# Patient Record
Sex: MALE | Race: WHITE | NOT HISPANIC OR LATINO
[De-identification: names, ages, dates, MRNs, and addresses within clinical notes are randomized per-mention and may not be internally consistent; named-entity substitution may affect disease eponyms.]

---

## 2017-01-10 ENCOUNTER — APPOINTMENT (OUTPATIENT)
Dept: HEART AND VASCULAR | Facility: CLINIC | Age: 67
End: 2017-01-10

## 2017-01-10 VITALS — BODY MASS INDEX: 36.61 KG/M2 | SYSTOLIC BLOOD PRESSURE: 134 MMHG | DIASTOLIC BLOOD PRESSURE: 80 MMHG | WEIGHT: 220 LBS

## 2017-01-10 DIAGNOSIS — R55 SYNCOPE AND COLLAPSE: ICD-10-CM

## 2017-01-10 DIAGNOSIS — I25.10 ATHEROSCLEROTIC HEART DISEASE OF NATIVE CORONARY ARTERY W/OUT ANGINA PECTORIS: ICD-10-CM

## 2017-01-10 DIAGNOSIS — I44.7 LEFT BUNDLE-BRANCH BLOCK, UNSPECIFIED: ICD-10-CM

## 2017-01-10 DIAGNOSIS — Z82.49 FAMILY HISTORY OF ISCHEMIC HEART DISEASE AND OTHER DISEASES OF THE CIRCULATORY SYSTEM: ICD-10-CM

## 2017-01-11 PROBLEM — R55 SYNCOPE AND COLLAPSE: Status: ACTIVE | Noted: 2017-01-10

## 2017-02-14 ENCOUNTER — FORM ENCOUNTER (OUTPATIENT)
Age: 67
End: 2017-02-14

## 2017-02-15 ENCOUNTER — OUTPATIENT (OUTPATIENT)
Dept: OUTPATIENT SERVICES | Facility: HOSPITAL | Age: 67
LOS: 1 days | End: 2017-02-15
Payer: MEDICARE

## 2017-02-15 DIAGNOSIS — I25.10 ATHEROSCLEROTIC HEART DISEASE OF NATIVE CORONARY ARTERY WITHOUT ANGINA PECTORIS: ICD-10-CM

## 2017-02-15 DIAGNOSIS — R06.09 OTHER FORMS OF DYSPNEA: ICD-10-CM

## 2017-02-15 PROCEDURE — 93018 CV STRESS TEST I&R ONLY: CPT

## 2017-02-15 PROCEDURE — 78452 HT MUSCLE IMAGE SPECT MULT: CPT

## 2017-02-15 PROCEDURE — 78452 HT MUSCLE IMAGE SPECT MULT: CPT | Mod: 26

## 2017-02-15 PROCEDURE — 93016 CV STRESS TEST SUPVJ ONLY: CPT

## 2017-02-15 PROCEDURE — A9505: CPT

## 2017-02-15 PROCEDURE — A9500: CPT

## 2017-02-15 PROCEDURE — 93017 CV STRESS TEST TRACING ONLY: CPT

## 2020-06-17 ENCOUNTER — APPOINTMENT (OUTPATIENT)
Dept: PULMONOLOGY | Facility: CLINIC | Age: 70
End: 2020-06-17
Payer: MEDICARE

## 2020-06-17 VITALS
RESPIRATION RATE: 12 BRPM | SYSTOLIC BLOOD PRESSURE: 120 MMHG | HEART RATE: 81 BPM | TEMPERATURE: 98 F | WEIGHT: 247 LBS | BODY MASS INDEX: 41.15 KG/M2 | DIASTOLIC BLOOD PRESSURE: 82 MMHG | HEIGHT: 65 IN | OXYGEN SATURATION: 97 %

## 2020-06-17 DIAGNOSIS — Z11.59 ENCOUNTER FOR SCREENING FOR OTHER VIRAL DISEASES: ICD-10-CM

## 2020-06-17 DIAGNOSIS — Z23 ENCOUNTER FOR IMMUNIZATION: ICD-10-CM

## 2020-06-17 DIAGNOSIS — F17.200 NICOTINE DEPENDENCE, UNSPECIFIED, UNCOMPLICATED: ICD-10-CM

## 2020-06-17 PROCEDURE — 99204 OFFICE O/P NEW MOD 45 MIN: CPT

## 2020-06-17 NOTE — REVIEW OF SYSTEMS
[Dyspnea] : dyspnea [Wheezing] : wheezing [Negative] : Endocrine [SOB on Exertion] : sob on exertion [Cough] : no cough [Hemoptysis] : no hemoptysis [Chest Tightness] : no chest tightness [Frequent URIs] : no frequent URIs [Sputum] : no sputum [Pleuritic Pain] : no pleuritic pain [A.M. Dry Mouth] : no a.m. dry mouth

## 2020-06-17 NOTE — PHYSICAL EXAM
[No Acute Distress] : no acute distress [Normal Oropharynx] : normal oropharynx [Normal Appearance] : normal appearance [No Neck Mass] : no neck mass [Normal Rate/Rhythm] : normal rate/rhythm [Normal S1, S2] : normal s1, s2 [No Murmurs] : no murmurs [No Resp Distress] : no resp distress [No Abnormalities] : no abnormalities [Benign] : benign [Clear to Auscultation Bilaterally] : clear to auscultation bilaterally [Normal Gait] : normal gait [No Cyanosis] : no cyanosis [No Clubbing] : no clubbing [FROM] : FROM [No Edema] : no edema [No Focal Deficits] : no focal deficits [Normal Color/ Pigmentation] : normal color/ pigmentation [Normal Affect] : normal affect [Oriented x3] : oriented x3 [III] : Mallampati Class: III

## 2020-06-17 NOTE — ASSESSMENT
[FreeTextEntry1] : SOB\par \par Pt has not followed with cardiology Dr. Geronimo since 2017.  Last echo from 2017 did not show pulmonary HTN.  Pt has gained 27 lbs since 2017.  Discussed when he bends down his abdomen pushes up on the lung.  Discussed SOB could be multifocal due to cardiac disease or lung disease.  He should follow with Dr. Geronimo regarding the heart.  \par \par plan\par - Follow with echo with Dr. Geronimo (r/o pulmonary HTN) \par - Follow on below studies \par \par Current Smoker\par \par Pt with history of 50 yrs of smoking, 1 pack a day.  He is cutting down and taking only a couple puffs of one cigarette.  Discussed we have to r/o emphysema. \par \par Discussed smoking cessation.  I discussed in details with the patient the health risk effects of tobacco smoking. The risks include, but not limited to COPD, cancer of head and neck/lung/stomach/ bladder, and coronary artery disease.  . \par \par Plan\par \par - Smoking cessation\par - Follow with PFT given script for COVID test to get done 3 days prior to PFT \par \par Snoring\par \par KOMAL JACOBS is to have a sleep study. I discussed sleep apnea in detail with the patient. I explained the benefit of weight loss for sleep apnea.  Mallampati III  and neck circumference is greater than 17  inch.\par \par I discussed the short and long term health effect of the obstructive seep apnea with the patient. These effects include, but not limited to, uncontrolled hypertension, CAD, arrhythmias, sudden death, CVA, and pulmonary hypertension. I advised the patient to avoid sedatives, narcotics, driving, and sleeping pills in the meantime. I discussed the therapeutic options including but not limited to CPAP, surgery, and oral appliance. Further recommendations will follow after sleep study.\par \par Plan\par - Follow with sleep study (home).  Pt was given the deposable sleep study equipment during vist \par \par

## 2020-06-21 ENCOUNTER — APPOINTMENT (OUTPATIENT)
Dept: SLEEP CENTER | Facility: HOME HEALTH | Age: 70
End: 2020-06-21
Payer: MEDICARE

## 2020-06-21 PROCEDURE — G0400: CPT | Mod: 26

## 2020-06-23 ENCOUNTER — APPOINTMENT (OUTPATIENT)
Dept: HEART AND VASCULAR | Facility: CLINIC | Age: 70
End: 2020-06-23
Payer: MEDICARE

## 2020-06-23 ENCOUNTER — NON-APPOINTMENT (OUTPATIENT)
Age: 70
End: 2020-06-23

## 2020-06-23 ENCOUNTER — OUTPATIENT (OUTPATIENT)
Dept: OUTPATIENT SERVICES | Facility: HOSPITAL | Age: 70
LOS: 1 days | End: 2020-06-23

## 2020-06-23 VITALS
SYSTOLIC BLOOD PRESSURE: 122 MMHG | HEART RATE: 84 BPM | WEIGHT: 247 LBS | DIASTOLIC BLOOD PRESSURE: 70 MMHG | BODY MASS INDEX: 41.1 KG/M2

## 2020-06-23 DIAGNOSIS — G47.33 OBSTRUCTIVE SLEEP APNEA (ADULT) (PEDIATRIC): ICD-10-CM

## 2020-06-23 DIAGNOSIS — I07.1 RHEUMATIC TRICUSPID INSUFFICIENCY: ICD-10-CM

## 2020-06-23 DIAGNOSIS — R06.02 SHORTNESS OF BREATH: ICD-10-CM

## 2020-06-23 PROCEDURE — 93306 TTE W/DOPPLER COMPLETE: CPT

## 2020-06-23 PROCEDURE — 99204 OFFICE O/P NEW MOD 45 MIN: CPT | Mod: 25

## 2020-06-23 PROCEDURE — 93000 ELECTROCARDIOGRAM COMPLETE: CPT

## 2020-06-23 RX ORDER — ASPIRIN 81 MG/1
81 TABLET ORAL
Refills: 0 | Status: ACTIVE | COMMUNITY

## 2020-06-23 NOTE — HISTORY OF PRESENT ILLNESS
[FreeTextEntry1] : still smoking 1ppd. has chronic KELLY but past few months has noticed intermittent 1-2 flight KELLY- also noticed KELLY when rushes- worse post prandial. No CP. no PND, has chronic @ pillow- had sleep study- results pending. no edema- also gets OOB bending over

## 2020-06-23 NOTE — DISCUSSION/SUMMARY
[FreeTextEntry1] : EKG:NSR,IVCD(no change)\par ECHO:\par normal LVEF, minimal MR/TR,LVH,normal LVEF\par called for and received fax of labs \par TC= 192mg%,TG= 167mg%, LDL= 119mg%, hbA1c= 6.2\par not on low fat diet, \par discussed diet at length- f/u hbA1c with PCP \par spoke to son- he states that his father has not been taking lipitor regularly- ( mostly not)\par pt admits to not taking lipitor- advised to take regularly and repeat albs- if LDL .70mg% would start repatha- given hx would get CCTA for KELLY- although I suspect due to weight and smoking- needs to stop smoking

## 2020-06-30 ENCOUNTER — APPOINTMENT (OUTPATIENT)
Dept: CT IMAGING | Facility: CLINIC | Age: 70
End: 2020-06-30
Payer: MEDICARE

## 2020-06-30 ENCOUNTER — OUTPATIENT (OUTPATIENT)
Dept: OUTPATIENT SERVICES | Facility: HOSPITAL | Age: 70
LOS: 1 days | End: 2020-06-30

## 2020-06-30 ENCOUNTER — RESULT REVIEW (OUTPATIENT)
Age: 70
End: 2020-06-30

## 2020-06-30 PROCEDURE — 75574 CT ANGIO HRT W/3D IMAGE: CPT | Mod: 26

## 2020-07-06 ENCOUNTER — APPOINTMENT (OUTPATIENT)
Dept: PULMONOLOGY | Facility: CLINIC | Age: 70
End: 2020-07-06

## 2020-07-06 VITALS
SYSTOLIC BLOOD PRESSURE: 122 MMHG | TEMPERATURE: 97 F | HEART RATE: 70 BPM | OXYGEN SATURATION: 97 % | RESPIRATION RATE: 17 BRPM | DIASTOLIC BLOOD PRESSURE: 69 MMHG

## 2020-07-06 NOTE — H&P ADULT - ASSESSMENT
71 yo obese M current smoker non-compliant with PMHx 2V CAD s/p PCI RPDA 2013 at [facility], HTN, HLD, 1st degree AV block, known IVCD/ LBBB, tricuspid regurgitation, Pre-Diabetes (Hemoglobin A1C -6.0% upon arrival to cath lab), who presents for cardiac cath to rule out CAD progression due to patient's risk factors, CCS Class Angina Class III Equivalent symptoms, and positive CCTA.     ASA III           Mallampati IV     Risks & benefits of procedure and alternative therapy have been explained to the patient including but not limited to: allergic reaction, bleeding w/possible need for blood transfusion, infection, renal and vascular compromise, limb damage, arrhythmia, stroke, vessel dissection/perforation, Myocardial infarction, emergent CABG. Informed consent obtained and in chart    Hgb/HCT 15.9/48.6-normal. Patient denies bleeding. BRBPR, melena, hematuria, hematemesis. Patient is on ASA 81 mg PO Daily however is non-compliant. ASA  mg PO x 1 and Plavix 600 mg PO x 1 given prior to procedure.    Labs reveal Leukocytosis WBC 11.93 with left shift Neutrophils 7.86. Patient is afebrile and denies fever, chills, cough. No tachycardia. Dr. Gaby hollingsworth.

## 2020-07-06 NOTE — H&P ADULT - NSHPLABSRESULTS_GEN_ALL_CORE
15.9   11.93 )-----------( 150      ( 08 Jul 2020 13:45 )             48.6       07-08    139  |  104  |  10  ----------------------------<  96  4.1   |  22  |  0.75    Ca    9.5      08 Jul 2020 13:45    TPro  7.0  /  Alb  4.1  /  TBili  0.6  /  DBili  <0.2  /  AST  22  /  ALT  33  /  AlkPhos  124<H>  07-08      PT/INR - ( 08 Jul 2020 13:45 )   PT: 12.2 sec;   INR: 1.02          PTT - ( 08 Jul 2020 13:45 )  PTT:42.5 sec    CARDIAC MARKERS ( 08 Jul 2020 13:45 )  x     / x     / 93 U/L / x     / 2.3 ng/mL            EKG: NSR at 70 bpm with 1st degree AV block. LBBB. Left Axis Deviation.

## 2020-07-06 NOTE — H&P ADULT - NSICDXPASTMEDICALHX_GEN_ALL_CORE_FT
PAST MEDICAL HISTORY:  CAD (coronary artery disease)     HLD (hyperlipidemia)     HTN (hypertension)     LBBB (left bundle branch block) ? PAST MEDICAL HISTORY:  CAD (coronary artery disease)     HLD (hyperlipidemia)     HTN (hypertension)     LBBB (left bundle branch block)

## 2020-07-06 NOTE — H&P ADULT - HISTORY OF PRESENT ILLNESS
71 yo M current smoker PMHx CAD, HTN, HLD, 1st degree AV block, ?known LBBB, tricuspid regurg c/o worsening KELLY upon 1-2 blocks ambulation. Pt also endorses 2 pillow orthopnea. Denies cp, palpitations, lightheadedness, recent syncope, LE edema, PND, fever, chills, cough, n/v/d. CCTA 6/30/2020: mod-severe stenosis of pLAD, severe stenosis D1 (small vessel), mod stenosis D2 CTA FFR: . Echo 6/23/20: EF 65%. Thickened mitral valve and aortic valve. Mild basal septal hypertrophy. No WMA.   In light of pt risk factors, CCS Class II angina equivalent symptoms, and positive CCTA with CT FFR, he is now referred to [facility] for cardiac catheterization with possible intervention if clinically indicated.   Cardiac cath 2013: s/p GEETA RPDA. LM nl, LAD mild luminal irregularities, D1 ostial 70%, Prox D2 60%, LCx luminal irregularities, RCA luminal irregularities of RCA, mRPDA 95%. EF 60%. 71 yo M current smoker PMHx CAD (, HTN, HLD, 1st degree AV block, ?known LBBB, tricuspid regurg c/o worsening KELLY upon 1-2 blocks ambulation. Pt also endorses 2 pillow orthopnea. Denies cp, palpitations, lightheadedness, recent syncope, LE edema, PND, fever, chills, cough, n/v/d. CCTA 6/30/2020: mod-severe stenosis of pLAD, severe stenosis D1 (small vessel), mod stenosis D2 CTA FFR: . Echo 6/23/20: EF 65%. Thickened mitral valve and aortic valve. Mild basal septal hypertrophy. No WMA.   In light of pt risk factors, CCS Class II angina equivalent symptoms, and positive CCTA with CT FFR, he is now referred to [facility] for cardiac catheterization with possible intervention if clinically indicated.   Cardiac cath 2013: s/p GEETA RPDA. LM nl, LAD mild luminal irregularities, D1 ostial 70%, Prox D2 60%, LCx luminal irregularities, RCA luminal irregularities of RCA, mRPDA 95%. EF 60%. 69 yo M current smoker PMHx CAD (s/p PCI RPDA 2013, ?known 3vCAD?), HTN, HLD, 1st degree AV block, ?known LBBB, tricuspid regurg c/o worsening KELLY upon 1-2 blocks ambulation. Pt also endorses 2 pillow orthopnea. Denies cp, palpitations, lightheadedness, recent syncope, LE edema, PND, fever, chills, cough, n/v/d. CCTA 6/30/2020: mod-severe stenosis of pLAD, severe stenosis D1 (small vessel), mod stenosis D2 CTA FFR: . Echo 6/23/20: EF 65%. Thickened mitral valve and aortic valve. Mild basal septal hypertrophy. No WMA.   In light of pt risk factors, CCS Class II angina equivalent symptoms, and positive CCTA with CT FFR, he is now referred to [facility] for cardiac catheterization with possible intervention if clinically indicated.   Cardiac cath 2013: s/p GEETA RPDA. LM nl, LAD mild luminal irregularities, D1 ostial 70%, Prox D2 60%, LCx luminal irregularities, RCA luminal irregularities of RCA, mRPDA 95%. EF 60%. COVID Testing not yet scheduled - will call back when scheduled  Confirm meds and social history please  69 yo M current smoker PMHx CAD (s/p PCI RPDA 2013, ?known 3vCAD?), HTN, HLD, 1st degree AV block, ?known LBBB, tricuspid regurg c/o worsening KELLY upon 1-2 blocks ambulation. Pt also endorses 2 pillow orthopnea. Denies cp, palpitations, lightheadedness, recent syncope, LE edema, PND, fever, chills, cough, n/v/d. CCTA 6/30/2020: mod-severe stenosis of pLAD, severe stenosis D1 (small vessel), mod stenosis D2 CTA FFR: . Echo 6/23/20: EF 65%. Thickened mitral valve and aortic valve. Mild basal septal hypertrophy. No WMA.   In light of pt risk factors, CCS Class II angina equivalent symptoms, and positive CCTA with CT FFR, he is now referred to [facility] for cardiac catheterization with possible intervention if clinically indicated.   Cardiac cath 2013: s/p GEETA RPDA. LM nl, LAD mild luminal irregularities, D1 ostial 70%, Prox D2 60%, LCx luminal irregularities, RCA luminal irregularities of RCA, mRPDA 95%. EF 60%. Covid test on arrival to [facility]   71 yo obese M current smoker non-compliant with PMHx 2V CAD s/p PCI RPDA 2013 at [facility], HTN, HLD, 1st degree AV block, known IVCD/ LBBB, tricuspid regurgitation, Pre-Diabetes (Hemoglobin A1C -6.0% upon arrival to cath lab) c/o worsening KELLY upon 1-2 blocks ambulation. Pt also endorses 2 pillow orthopnea. Denies cp, palpitations, lightheadedness, recent syncope, LE edema, PND, fever, chills, cough, n/v/d. CCTA 6/30/2020: mod-severe stenosis of pLAD, severe stenosis D1 (small vessel), mod stenosis D2, non -obstructive disease in remaining coronary artery segments, concentric LVH. Echo 6/23/20: EF 65%. Thickened mitral valve and aortic valve, Mild basal septal hypertrophy, mild LVH,  no segmental wall motion abnormalities, mild Diastolic Dysfunction.   In light of pt risk factors, CCS Class Angina Class III Equivalent symptoms, and positive CCTA he is now referred to [facility] for cardiac catheterization with possible intervention if clinically indicated to rule out CAD progression.    Cardiac cath 2013: s/p GEETA RPDA. LM nl, LAD mild luminal irregularities, D1 ostial 70%, Prox D2 60%, LCx luminal irregularities, RCA luminal irregularities of RCA, mRPDA 95%. EF 60%. Covid test on arrival to [facility] -negative  69 yo obese M current smoker non-compliant with PMHx 2V CAD s/p PCI RPDA 2013 at [facility], HTN, HLD, 1st degree AV block, known IVCD/ LBBB, tricuspid regurgitation, Pre-Diabetes (Hemoglobin A1C -6.0% upon arrival to cath lab) c/o worsening KELLY upon 1-2 blocks ambulation. Pt also endorses 2 pillow orthopnea. Denies cp, palpitations, lightheadedness, recent syncope, LE edema, PND, fever, chills, cough, n/v/d. CCTA 6/30/2020: mod-severe stenosis of pLAD, severe stenosis D1 (small vessel), mod stenosis D2, non -obstructive disease in remaining coronary artery segments, concentric LVH. Echo 6/23/20: EF 65%. Thickened mitral valve and aortic valve, Mild basal septal hypertrophy, mild LVH,  no segmental wall motion abnormalities, mild Diastolic Dysfunction.   In light of pt risk factors, CCS Class Angina Class III Equivalent symptoms, and positive CCTA he is now referred to [facility] for cardiac catheterization with possible intervention if clinically indicated to rule out CAD progression.    Cardiac cath 2013: s/p GEETA RPDA. LM nl, LAD mild luminal irregularities, D1 ostial 70%, Prox D2 60%, LCx luminal irregularities, RCA luminal irregularities of RCA, mRPDA 95%. EF 60%.

## 2020-07-06 NOTE — H&P ADULT - NSHPPOADEEPVENOUSTHROMB_GEN_A_CORE
Sprycel refill request rec from [facility]. Per 3/15/18 office note from Dr Castle pt will continue Sprycel 100 mg daily. Pt has follow up appt on 6/7/18 with Kelly BARBA NP. I have escribed a new rx to [facility].    (Pt gets a 90 day supply of the Sprycel).   no

## 2020-07-08 ENCOUNTER — INPATIENT (INPATIENT)
Facility: HOSPITAL | Age: 70
LOS: 0 days | Discharge: HOME | DRG: 247 | End: 2020-07-09
Attending: INTERNAL MEDICINE | Admitting: INTERNAL MEDICINE
Payer: COMMERCIAL

## 2020-07-08 ENCOUNTER — TRANSCRIPTION ENCOUNTER (OUTPATIENT)
Age: 70
End: 2020-07-08

## 2020-07-08 DIAGNOSIS — G47.30 SLEEP APNEA, UNSPECIFIED: ICD-10-CM

## 2020-07-08 DIAGNOSIS — I25.119 ATHEROSCLEROTIC HEART DISEASE OF NATIVE CORONARY ARTERY WITH UNSPECIFIED ANGINA PECTORIS: ICD-10-CM

## 2020-07-08 DIAGNOSIS — Z98.890 OTHER SPECIFIED POSTPROCEDURAL STATES: Chronic | ICD-10-CM

## 2020-07-08 LAB
A1C WITH ESTIMATED AVERAGE GLUCOSE RESULT: 6 % — HIGH (ref 4–5.6)
ALBUMIN SERPL ELPH-MCNC: 4.1 G/DL — SIGNIFICANT CHANGE UP (ref 3.3–5)
ALP SERPL-CCNC: 124 U/L — HIGH (ref 40–120)
ALT FLD-CCNC: 33 U/L — SIGNIFICANT CHANGE UP (ref 10–45)
ANION GAP SERPL CALC-SCNC: 13 MMOL/L — SIGNIFICANT CHANGE UP (ref 5–17)
APTT BLD: 42.5 SEC — HIGH (ref 27.5–35.5)
AST SERPL-CCNC: 22 U/L — SIGNIFICANT CHANGE UP (ref 10–40)
BASOPHILS # BLD AUTO: 0.07 K/UL — SIGNIFICANT CHANGE UP (ref 0–0.2)
BASOPHILS NFR BLD AUTO: 0.6 % — SIGNIFICANT CHANGE UP (ref 0–2)
BILIRUB SERPL-MCNC: 0.6 MG/DL — SIGNIFICANT CHANGE UP (ref 0.2–1.2)
BUN SERPL-MCNC: 10 MG/DL — SIGNIFICANT CHANGE UP (ref 7–23)
CALCIUM SERPL-MCNC: 9.5 MG/DL — SIGNIFICANT CHANGE UP (ref 8.4–10.5)
CHLORIDE SERPL-SCNC: 104 MMOL/L — SIGNIFICANT CHANGE UP (ref 96–108)
CHOLEST SERPL-MCNC: 117 MG/DL — SIGNIFICANT CHANGE UP (ref 10–199)
CK MB CFR SERPL CALC: 2.3 NG/ML — SIGNIFICANT CHANGE UP (ref 0–6.7)
CK SERPL-CCNC: 93 U/L — SIGNIFICANT CHANGE UP (ref 30–200)
CO2 SERPL-SCNC: 22 MMOL/L — SIGNIFICANT CHANGE UP (ref 22–31)
CREAT SERPL-MCNC: 0.75 MG/DL — SIGNIFICANT CHANGE UP (ref 0.5–1.3)
EOSINOPHIL # BLD AUTO: 0.19 K/UL — SIGNIFICANT CHANGE UP (ref 0–0.5)
EOSINOPHIL NFR BLD AUTO: 1.6 % — SIGNIFICANT CHANGE UP (ref 0–6)
ESTIMATED AVERAGE GLUCOSE: 126 MG/DL — HIGH (ref 68–114)
GLUCOSE SERPL-MCNC: 96 MG/DL — SIGNIFICANT CHANGE UP (ref 70–99)
HCT VFR BLD CALC: 48.6 % — SIGNIFICANT CHANGE UP (ref 39–50)
HDLC SERPL-MCNC: 32 MG/DL — LOW
HGB BLD-MCNC: 15.9 G/DL — SIGNIFICANT CHANGE UP (ref 13–17)
IMM GRANULOCYTES NFR BLD AUTO: 0.3 % — SIGNIFICANT CHANGE UP (ref 0–1.5)
INR BLD: 1.02 — SIGNIFICANT CHANGE UP (ref 0.88–1.16)
LIPID PNL WITH DIRECT LDL SERPL: 61 MG/DL — SIGNIFICANT CHANGE UP
LYMPHOCYTES # BLD AUTO: 25.7 % — SIGNIFICANT CHANGE UP (ref 13–44)
LYMPHOCYTES # BLD AUTO: 3.07 K/UL — SIGNIFICANT CHANGE UP (ref 1–3.3)
MCHC RBC-ENTMCNC: 27.4 PG — SIGNIFICANT CHANGE UP (ref 27–34)
MCHC RBC-ENTMCNC: 32.7 GM/DL — SIGNIFICANT CHANGE UP (ref 32–36)
MCV RBC AUTO: 83.8 FL — SIGNIFICANT CHANGE UP (ref 80–100)
MONOCYTES # BLD AUTO: 0.7 K/UL — SIGNIFICANT CHANGE UP (ref 0–0.9)
MONOCYTES NFR BLD AUTO: 5.9 % — SIGNIFICANT CHANGE UP (ref 2–14)
NEUTROPHILS # BLD AUTO: 7.86 K/UL — HIGH (ref 1.8–7.4)
NEUTROPHILS NFR BLD AUTO: 65.9 % — SIGNIFICANT CHANGE UP (ref 43–77)
NRBC # BLD: 0 /100 WBCS — SIGNIFICANT CHANGE UP (ref 0–0)
PLATELET # BLD AUTO: 150 K/UL — SIGNIFICANT CHANGE UP (ref 150–400)
POTASSIUM SERPL-MCNC: 4.1 MMOL/L — SIGNIFICANT CHANGE UP (ref 3.5–5.3)
POTASSIUM SERPL-SCNC: 4.1 MMOL/L — SIGNIFICANT CHANGE UP (ref 3.5–5.3)
PROT SERPL-MCNC: 7 G/DL — SIGNIFICANT CHANGE UP (ref 6–8.3)
PROTHROM AB SERPL-ACNC: 12.2 SEC — SIGNIFICANT CHANGE UP (ref 10.6–13.6)
RBC # BLD: 5.8 M/UL — SIGNIFICANT CHANGE UP (ref 4.2–5.8)
RBC # FLD: 13.9 % — SIGNIFICANT CHANGE UP (ref 10.3–14.5)
SARS-COV-2 RNA SPEC QL NAA+PROBE: SIGNIFICANT CHANGE UP
SODIUM SERPL-SCNC: 139 MMOL/L — SIGNIFICANT CHANGE UP (ref 135–145)
TOTAL CHOLESTEROL/HDL RATIO MEASUREMENT: 3.7 RATIO — SIGNIFICANT CHANGE UP (ref 3.4–9.6)
TRIGL SERPL-MCNC: 118 MG/DL — SIGNIFICANT CHANGE UP (ref 10–149)
WBC # BLD: 11.93 K/UL — HIGH (ref 3.8–10.5)
WBC # FLD AUTO: 11.93 K/UL — HIGH (ref 3.8–10.5)

## 2020-07-08 PROCEDURE — 92928 PRQ TCAT PLMT NTRAC ST 1 LES: CPT | Mod: LD

## 2020-07-08 PROCEDURE — 93458 L HRT ARTERY/VENTRICLE ANGIO: CPT | Mod: 26,59

## 2020-07-08 PROCEDURE — 93010 ELECTROCARDIOGRAM REPORT: CPT | Mod: 76

## 2020-07-08 PROCEDURE — 92929: CPT | Mod: LD

## 2020-07-08 RX ORDER — CLOPIDOGREL BISULFATE 75 MG/1
75 TABLET, FILM COATED ORAL DAILY
Refills: 0 | Status: DISCONTINUED | OUTPATIENT
Start: 2020-07-09 | End: 2020-07-09

## 2020-07-08 RX ORDER — SODIUM CHLORIDE 9 MG/ML
500 INJECTION INTRAMUSCULAR; INTRAVENOUS; SUBCUTANEOUS
Refills: 0 | Status: DISCONTINUED | OUTPATIENT
Start: 2020-07-08 | End: 2020-07-09

## 2020-07-08 RX ORDER — ATORVASTATIN CALCIUM 80 MG/1
1 TABLET, FILM COATED ORAL
Qty: 0 | Refills: 0 | DISCHARGE

## 2020-07-08 RX ORDER — ASPIRIN/CALCIUM CARB/MAGNESIUM 324 MG
81 TABLET ORAL DAILY
Refills: 0 | Status: DISCONTINUED | OUTPATIENT
Start: 2020-07-08 | End: 2020-07-09

## 2020-07-08 RX ORDER — SODIUM CHLORIDE 9 MG/ML
500 INJECTION INTRAMUSCULAR; INTRAVENOUS; SUBCUTANEOUS
Refills: 0 | Status: DISCONTINUED | OUTPATIENT
Start: 2020-07-08 | End: 2020-07-08

## 2020-07-08 RX ORDER — CLOPIDOGREL BISULFATE 75 MG/1
1 TABLET, FILM COATED ORAL
Qty: 30 | Refills: 11
Start: 2020-07-08 | End: 2021-07-02

## 2020-07-08 RX ORDER — ATORVASTATIN CALCIUM 80 MG/1
80 TABLET, FILM COATED ORAL AT BEDTIME
Refills: 0 | Status: DISCONTINUED | OUTPATIENT
Start: 2020-07-08 | End: 2020-07-09

## 2020-07-08 RX ORDER — CHLORHEXIDINE GLUCONATE 213 G/1000ML
1 SOLUTION TOPICAL ONCE
Refills: 0 | Status: COMPLETED | OUTPATIENT
Start: 2020-07-08 | End: 2020-07-08

## 2020-07-08 RX ORDER — NICOTINE POLACRILEX 2 MG
1 GUM BUCCAL DAILY
Refills: 0 | Status: DISCONTINUED | OUTPATIENT
Start: 2020-07-08 | End: 2020-07-09

## 2020-07-08 RX ORDER — ASPIRIN/CALCIUM CARB/MAGNESIUM 324 MG
1 TABLET ORAL
Qty: 30 | Refills: 11
Start: 2020-07-08 | End: 2021-07-02

## 2020-07-08 RX ORDER — ASPIRIN/CALCIUM CARB/MAGNESIUM 324 MG
1 TABLET ORAL
Qty: 0 | Refills: 0 | DISCHARGE

## 2020-07-08 RX ORDER — CLOPIDOGREL BISULFATE 75 MG/1
600 TABLET, FILM COATED ORAL ONCE
Refills: 0 | Status: COMPLETED | OUTPATIENT
Start: 2020-07-08 | End: 2020-07-08

## 2020-07-08 RX ORDER — NICOTINE POLACRILEX 2 MG
1 GUM BUCCAL ONCE
Refills: 0 | Status: DISCONTINUED | OUTPATIENT
Start: 2020-07-08 | End: 2020-07-08

## 2020-07-08 RX ORDER — ATORVASTATIN CALCIUM 80 MG/1
1 TABLET, FILM COATED ORAL
Qty: 30 | Refills: 3
Start: 2020-07-08 | End: 2020-11-04

## 2020-07-08 RX ORDER — ASPIRIN/CALCIUM CARB/MAGNESIUM 324 MG
325 TABLET ORAL ONCE
Refills: 0 | Status: COMPLETED | OUTPATIENT
Start: 2020-07-08 | End: 2020-07-08

## 2020-07-08 RX ADMIN — CLOPIDOGREL BISULFATE 600 MILLIGRAM(S): 75 TABLET, FILM COATED ORAL at 14:14

## 2020-07-08 RX ADMIN — SODIUM CHLORIDE 75 MILLILITER(S): 9 INJECTION INTRAMUSCULAR; INTRAVENOUS; SUBCUTANEOUS at 14:15

## 2020-07-08 RX ADMIN — Medication 325 MILLIGRAM(S): at 14:14

## 2020-07-08 RX ADMIN — ATORVASTATIN CALCIUM 80 MILLIGRAM(S): 80 TABLET, FILM COATED ORAL at 22:08

## 2020-07-08 RX ADMIN — SODIUM CHLORIDE 75 MILLILITER(S): 9 INJECTION INTRAMUSCULAR; INTRAVENOUS; SUBCUTANEOUS at 19:28

## 2020-07-08 RX ADMIN — Medication 81 MILLIGRAM(S): at 22:08

## 2020-07-08 NOTE — DISCHARGE NOTE PROVIDER - NSDCFUADDINST_GEN_ALL_CORE_FT
The catheter from your groin was removed and bleeding was stopped with a closure device.  After 24hours you may take off the dressing and shower. Wash the site with soap and water.  There is no need to put on another bandage.  Avoid tub baths, hot tubs or swimming for 5 days.     The catheter from your wrist was removed and bleeding was stopped by manual pressure.  Wash the site daily with soap and water.  There is no need to put on a bandage.      Call the Interventional Cardiolog [facility] at [phone] if any of following occur pertaining to your vascular access site:  Bleeding or hematoma formation (collection of blood under the skin), drainage or redness at the puncture site, numbness, decrease in strength, coolness or pale coloration of skin of the leg or hand. The catheter from your groin was removed and bleeding was stopped with a closure device.  After 24hours you may take off the dressing and shower. Wash the site with soap and water.  There is no need to put on another bandage.  Avoid tub baths, hot tubs or swimming for 5 days.     The catheter from your wrist was removed and bleeding was stopped by manual pressure.  Wash the site daily with soap and water.  There is no need to put on a bandage.      Call the Interventional Cardiology Team at [phone] if any of following occur pertaining to your vascular access site:  Bleeding or hematoma formation (collection of blood under the skin), drainage or redness at the puncture site, numbness, decrease in strength, paralysis (unable to move arm), coolness or pale coloration of skin of the leg or hand, fever, chills.

## 2020-07-08 NOTE — DISCHARGE NOTE PROVIDER - CARE PROVIDER_API CALL
Manan Geronimo S  CARDIOVASCULAR DISEASE  [address] [place] [address]  Phone: [phone]  Fax: [phone]  Follow Up Time: 1 week

## 2020-07-08 NOTE — DISCHARGE NOTE PROVIDER - HOSPITAL COURSE
Patient is a 61 year-old obese male current smoker, non-compliant with medical tx, with PMHx 2V CAD s/p PCI RPDA 2013 at [facility], HTN, HLD, 1st degree AV block, known IVCD/ LBBB, tricuspid regurgitation, Pre-Diabetes (Hemoglobin A1C -6.0% upon arrival to cath lab) c/o worsening KELLY upon 1-2 blocks ambulation. Pt also endorses 2 pillow orthopnea.     CCTA 6/30/2020: mod-severe stenosis of pLAD, severe stenosis D1 (small vessel), mod stenosis D2, non -obstructive disease in remaining coronary artery segments, concentric LVH. Echo 6/23/20: EF 65%. Thickened mitral valve and aortic valve, Mild basal septal hypertrophy, mild LVH,  no segmental wall motion abnormalities, mild Diastolic Dysfunction. Due to patient's risk factors, CCS Class Angina Class III equivalent symptoms, and positive CCTA, patient referred for cardiac catheterization. Patient loaded with Aspirin 325 mg and Plavix 600 mg prior to procedure.         Patient is s/p cardiac catheterization on 07/08/2020, revealing 2VCAD. patent RPDA stent. pLAD: 70-80%. dLAD: 30%. d2: 80%. d1 small vessel: 95%. Intervention: GEETA x 1 pLAD and GEETA x 1 D2 with final kissing balloon. EF: 60%. EDP: 13. Patient received a right radial TR band and  right groin was closed via perclose.         Patient seen and examined at bedside. VSS. Right radial site accessed, pulses 2+ at site and distally, no excessive bruising and hematoma. Right groin area assessed, pulses 2+ at side and distally. No excessive bruising or hematoma. Case discussed with Dr. Garner. Patient is stable for discharge. Patient is to continue Aspirin 81 mg, Plavix 75 mg, and Atorvastatin 80 mg upon discharge. Patient to follow-up with cardiologist,  in 1-2 weeks. Patient is a 61 year-old obese male current smoker, non-compliant with medical tx, with PMHx 2V CAD s/p PCI RPDA 2013 at [facility], HTN, HLD, 1st degree AV block, known IVCD/ LBBB, tricuspid regurgitation, Pre-Diabetes (Hemoglobin A1C -6.0% upon arrival to cath lab) c/o worsening KELLY upon 1-2 blocks ambulation. Pt also endorses 2 pillow orthopnea.     CCTA 6/30/2020: mod-severe stenosis of pLAD, severe stenosis D1 (small vessel), mod stenosis D2, non -obstructive disease in remaining coronary artery segments, concentric LVH. Echo 6/23/20: EF 65%. Thickened mitral valve and aortic valve, Mild basal septal hypertrophy, mild LVH,  no segmental wall motion abnormalities, mild Diastolic Dysfunction. Due to patient's risk factors, CCS Class Angina Class III equivalent symptoms, and positive CCTA, patient referred for cardiac catheterization. Patient loaded with Aspirin 325 mg and Plavix 600 mg prior to procedure.         Patient is s/p cardiac catheterization on 07/08/2020, revealing 2VCAD. patent RPDA stent. pLAD: 70-80%. dLAD: 30%. d2: 80%. d1 small vessel: 95%. Intervention: GEETA x 1 pLAD and GEETA x 1 D2 with final kissing balloon. EF: 60%. EDP: 13. Patient received a right radial TR band and  right groin was closed via perclose.         No significant events on telemetry overnight. Labs reviewed and stable. Patient has been medically cleared for discharge as per Dr. Garner. Patient has been given appropriate discharge instructions including medication regimen , access site management and follow up. Medications that patient needs refills, including Aspirin 81 mg, Plavix 75 mg, and Atorvastatin 80 mg  have been e-prescribed to preferred pharmacy.  Patient instructed to follow-up with Dr. Geronimo in 1-2 weeks.         Temp HR BP RR SpO2    Gen: NAD, A&O x3    Cards: RRR, clear S1 and S2 without murmur    Pulm: CTA B/L without w/r/r    Right Radial: No hematoma or ooze, RR 2+     Right Groin: No hematoma or ooze,  DP/PT 2+ B/L    Abd: soft, NT    Ext: no LE edema or ulcerations B/L Patient is a 61 year-old obese male current smoker, non-compliant with medical tx, with PMHx 2V CAD s/p PCI RPDA 2013 at [facility], HTN, HLD, 1st degree AV block, known IVCD/ LBBB, tricuspid regurgitation, Pre-Diabetes (Hemoglobin A1C -6.0% upon arrival to cath lab) c/o worsening KELLY upon 1-2 blocks ambulation. Pt also endorses 2 pillow orthopnea.     CCTA 6/30/2020: mod-severe stenosis of pLAD, severe stenosis D1 (small vessel), mod stenosis D2, non -obstructive disease in remaining coronary artery segments, concentric LVH. Echo 6/23/20: EF 65%. Thickened mitral valve and aortic valve, Mild basal septal hypertrophy, mild LVH,  no segmental wall motion abnormalities, mild Diastolic Dysfunction. Due to patient's risk factors, CCS Class Angina Class III equivalent symptoms, and positive CCTA, patient referred for cardiac catheterization. Patient loaded with Aspirin 325 mg and Plavix 600 mg prior to procedure.         Patient is s/p cardiac catheterization on 07/08/2020, revealing 2VCAD. patent RPDA stent. pLAD: 70-80%. dLAD: 30%. d2: 80%. d1 small vessel: 95%. Intervention: GEETA x 1 pLAD and GEETA x 1 D2 with final kissing balloon. EF: 60%. EDP: 13. Patient received a right radial TR band and  right groin was closed via perclose.         Tele overnight with some occasional dropped QR's, ECG with known 1st degree AVB and LBBB. Labs reviewed and stable. Patient has been medically cleared for discharge as per Dr. Garner. Patient has been given appropriate discharge instructions including medication regimen , access site management and follow up. Medications that patient needs refills, including Aspirin 81 mg, Plavix 75 mg, and Atorvastatin 80 mg  have been e-prescribed to preferred pharmacy.  Patient instructed to follow-up with Dr. Geronimo in 1-2 weeks.         Temp HR BP RR SpO2    Gen: NAD, A&O x3    Cards: RRR, clear S1 and S2 without murmur    Pulm: CTA B/L without w/r/r    Right Radial: No hematoma or ooze, RR 2+     Right Groin: No hematoma or ooze,  DP/PT 2+ B/L    Abd: soft, NT    Ext: no LE edema or ulcerations B/L Patient is a 61 year-old obese male current smoker, non-compliant with medical tx, with PMHx 2V CAD s/p PCI RPDA 2013 at [facility], HTN, HLD, 1st degree AV block, known IVCD/ LBBB, tricuspid regurgitation, Pre-Diabetes (Hemoglobin A1C -6.0% upon arrival to cath lab) c/o worsening KELLY upon 1-2 blocks ambulation. Pt also endorses 2 pillow orthopnea.     CCTA 6/30/2020: mod-severe stenosis of pLAD, severe stenosis D1 (small vessel), mod stenosis D2, non -obstructive disease in remaining coronary artery segments, concentric LVH. Echo 6/23/20: EF 65%. Thickened mitral valve and aortic valve, Mild basal septal hypertrophy, mild LVH,  no segmental wall motion abnormalities, mild Diastolic Dysfunction. Due to patient's risk factors, CCS Class Angina Class III equivalent symptoms, and positive CCTA, patient referred for cardiac catheterization. Patient loaded with Aspirin 325 mg and Plavix 600 mg prior to procedure.         Patient is s/p cardiac catheterization on 07/08/2020, revealing 2VCAD. patent RPDA stent. pLAD: 70-80%. dLAD: 30%. d2: 80%. d1 small vessel: 95%. Intervention: GEETA x 1 pLAD and GEETA x 1 D2 with final kissing balloon. EF: 60%. EDP: 13. Patient received a right radial TR band and  right groin was closed via perclose.         Tele overnight with some occasional dropped QR's, ECG with 2nd degree AVB type I and known LBBB. Labs reviewed and stable. Dr. Geronimo made aware and recommends EP evaluate the pt prior to discharge home. Patient has been medically cleared for discharge as per Dr. Garner. Patient has been given appropriate discharge instructions including medication regimen , access site management and follow up. Medications that patient needs refills, including Aspirin 81 mg, Plavix 75 mg, and Atorvastatin 80 mg  have been e-prescribed to preferred pharmacy. Patient instructed to follow-up with Dr. Geronimo in 1-2 weeks.         Temp 98.6F, HR 74, /79,  RR 18, SpO2 92% RA    Gen: NAD, A&O x3    Cards: RRR, clear S1 and S2 without murmur    Pulm: CTA B/L without w/r/r    Right Radial: No hematoma or ooze, RR 2+     Right Groin: No hematoma or ooze,  DP/PT 2+ B/L    Abd: soft, NT    Ext: no LE edema or ulcerations B/L Patient is a 61 year-old obese male current smoker, non-compliant with medical tx, with PMHx 2V CAD s/p PCI RPDA 2013 at [facility], HTN, HLD, 1st degree AV block, known IVCD/ LBBB, tricuspid regurgitation, Pre-Diabetes (Hemoglobin A1C -6.0% upon arrival to cath lab) c/o worsening KELLY upon 1-2 blocks ambulation. Pt also endorses 2 pillow orthopnea.     CCTA 6/30/2020: mod-severe stenosis of pLAD, severe stenosis D1 (small vessel), mod stenosis D2, non -obstructive disease in remaining coronary artery segments, concentric LVH. Echo 6/23/20: EF 65%. Thickened mitral valve and aortic valve, Mild basal septal hypertrophy, mild LVH,  no segmental wall motion abnormalities, mild Diastolic Dysfunction. Due to patient's risk factors, CCS Class Angina Class III equivalent symptoms, and positive CCTA, patient referred for cardiac catheterization. Patient loaded with Aspirin 325 mg and Plavix 600 mg prior to procedure.         Patient is s/p cardiac catheterization on 07/08/2020, revealing 2VCAD. patent RPDA stent. pLAD: 70-80%. dLAD: 30%. d2: 80%. d1 small vessel: 95%. Intervention: GEETA x 1 pLAD and GEETA x 1 D2 with final kissing balloon. EF: 60%. EDP: 13. Patient received a right radial TR band and  right groin was closed via perclose.         Tele overnight with some occasional dropped QR's, ECG with 2nd degree AVB type I and known LBBB. Labs reviewed and stable. Dr. Geronimo made aware and recommends EP evaluate the pt prior to discharge home. EP evaluated patient and severe SOLA likely the etiology of Wenckebach as it occurred while patient sleeping. 2 week Monitor to be mailed to patient to rule out advanced blocks given baseline LBBB and 1st degree AVB. Patient has been medically cleared for discharge as per Dr. Garner. Patient has been given appropriate discharge instructions including medication regimen , access site management and follow up. Medications that patient needs refills, including Aspirin 81 mg, Plavix 75 mg, and Atorvastatin 80 mg  have been e-prescribed to preferred pharmacy. Patient instructed to follow-up with Dr. Geronimo in 1-2 weeks. Patient refused Nicotine patch while in hospital and on discharge.         Temp 98.6F, HR 74, /79,  RR 18, SpO2 92% RA    Gen: NAD, A&O x3    Cards: RRR, clear S1 and S2 without murmur    Pulm: CTA B/L without w/r/r    Right Radial: No hematoma or ooze, RR 2+     Right Groin: No hematoma or ooze,  DP/PT 2+ B/L    Abd: Obese. soft, NT/ND    Ext: no LE edema or ulcerations B/L

## 2020-07-08 NOTE — DISCHARGE NOTE PROVIDER - NSDCMRMEDTOKEN_GEN_ALL_CORE_FT
Aspirin Enteric Coated 81 mg oral delayed release tablet: 1 tab(s) orally once a day  clopidogrel 75 mg oral tablet: 1 tab(s) orally once a day  Lipitor 80 mg oral tablet: 1 tab(s) orally once a day

## 2020-07-08 NOTE — PROVIDER CONTACT NOTE (OTHER) - SITUATION
Patient got up to go to the bathroom.  Pt stated that he wants to wash his hands and pray and that he wanted to walk to the bathroom.

## 2020-07-08 NOTE — DISCHARGE NOTE PROVIDER - NSDCCPCAREPLAN_GEN_ALL_CORE_FT
PRINCIPAL DISCHARGE DIAGNOSIS  Diagnosis: Mild CAD  Assessment and Plan of Treatment: You underwent a cardiac angiogram and received a stent to the proximal left anterior descending artery and a stent to a branch off the left anterior descending artery.   PLEASE CONTINUE ASPIRIN 81MG DAILY AND PLAVIX 75MG DAILY. DO NOT STOP THESE MEDICATIONS FOR ANY REASON AS THEY ARE KEEPING YOUR STENT OPEN AND PREVENTING A HEART ATTACK.  Please also continue your Lipitor (ATORVASTATIN) 80 mg daily as this helps decrease the amount plaque in the arteries of your heart. These medications were sent to your preferred pharmacy.   Please avoid strenuous activity or heavy lifting for the next five days. Do not take a bath or swim for the next five days; you may shower. For any bleeding or hematoma formation (hardened blood collection under the skin) at the access site of right groin and right wrist please hold pressure and go to the emergency room. Please follow up with Dr. Geronimo in 1-2 weeks. For recurrent chest pain, please call your doctor or go to the emergency room.      SECONDARY DISCHARGE DIAGNOSES  Diagnosis: Hyperlipidemia  Assessment and Plan of Treatment: Please continue your ATORVASTATIN 81 MG (LIPITOR) as it helps keep your cholesterol low. High cholesterol contributes to heart disease.    Diagnosis: Pre-diabetes  Assessment and Plan of Treatment: Your hemoglobin a1c was 6.0. This represents your average blood sugar for the past 3 months. This puts you in the pre-diabetic range. We like to see this number below 5.3.  Please continue to follow a low carbohydrate, low sugar diet, maintain healthy exercise, and a healthy weight. Please follow-up with your PCP for further management. PRINCIPAL DISCHARGE DIAGNOSIS  Diagnosis: Mild CAD  Assessment and Plan of Treatment: You underwent a cardiac angiogram and received a stent to the proximal left anterior descending artery and a stent to a branch off the left anterior descending artery.   PLEASE CONTINUE ASPIRIN 81MG DAILY AND PLAVIX 75MG DAILY. DO NOT STOP THESE MEDICATIONS FOR ANY REASON AS THEY ARE KEEPING YOUR STENT OPEN AND PREVENTING A HEART ATTACK.  Please also continue your Lipitor (ATORVASTATIN) 80 mg daily as this helps decrease the amount plaque in the arteries of your heart. These medications were sent to your preferred pharmacy.   Please avoid strenuous activity or heavy lifting for the next five days. Do not take a bath or swim for the next five days; you may shower. For any bleeding or hematoma formation (hardened blood collection under the skin) at the access site of right groin and right wrist please hold pressure and go to the emergency room. Please follow up with Dr. Geronimo in 1-2 weeks. For recurrent chest pain, please call your doctor or go to the emergency room.      SECONDARY DISCHARGE DIAGNOSES  Diagnosis: Hyperlipidemia  Assessment and Plan of Treatment: Please continue your ATORVASTATIN 80 MG (LIPITOR) as it helps keep your cholesterol low. High cholesterol contributes to heart disease.    Diagnosis: Pre-diabetes  Assessment and Plan of Treatment: Your hemoglobin a1c was 6.0. This represents your average blood sugar for the past 3 months. This puts you in the pre-diabetic range. We like to see this number below 5.3.  Please continue to follow a low carbohydrate, low sugar diet, maintain healthy exercise, and a healthy weight. Please follow-up with your PCP for further management. PRINCIPAL DISCHARGE DIAGNOSIS  Diagnosis: Mild CAD  Assessment and Plan of Treatment: You underwent a cardiac angiogram and received a stent to the proximal left anterior descending artery and a stent to a branch off the left anterior descending artery.   PLEASE CONTINUE ASPIRIN 81MG DAILY AND PLAVIX 75MG DAILY. DO NOT STOP THESE MEDICATIONS FOR ANY REASON AS THEY ARE KEEPING YOUR STENT OPEN AND PREVENTING A HEART ATTACK.  Please also continue your Lipitor (ATORVASTATIN) 80 mg daily as this helps decrease the amount plaque in the arteries of your heart. These medications were sent to your preferred pharmacy.   Please avoid strenuous activity or heavy lifting for the next five days. Do not take a bath or swim for the next five days; you may shower. For any bleeding or hematoma formation (hardened blood collection under the skin) at the access site of right groin and right wrist please hold pressure and go to the emergency room. Please follow up with Dr. Geronimo in 1-2 weeks. For recurrent chest pain, please call your doctor or go to the emergency room.      SECONDARY DISCHARGE DIAGNOSES  Diagnosis: Wenckebach second degree AV block  Assessment and Plan of Treatment: You were found to have this heart block on your cardiac monitor while in the hospital and seen by Electrophysiology. This is likely due to your sleep Apnea which needs to be treated. You will be mailed a two week cardiac monitor to your home. Follow-up with Dr. Geronimo in 1-2 weeks    Diagnosis: Pre-diabetes  Assessment and Plan of Treatment: Your hemoglobin a1c was 6.0. This represents your average blood sugar for the past 3 months. This puts you in the pre-diabetic range. We like to see this number below 5.3.  Please continue to follow a low carbohydrate, low sugar diet, maintain healthy exercise, and a healthy weight. Please follow-up with your PCP for further management.      Diagnosis: Hyperlipidemia  Assessment and Plan of Treatment: Please continue your ATORVASTATIN 80 MG (LIPITOR) as it helps keep your cholesterol low. High cholesterol contributes to heart disease. PRINCIPAL DISCHARGE DIAGNOSIS  Diagnosis: Coronary artery disease  Assessment and Plan of Treatment: You underwent a cardiac angiogram and received a stent to the proximal left anterior descending artery and a stent to a branch off the left anterior descending artery.   PLEASE CONTINUE ASPIRIN 81MG DAILY AND PLAVIX 75MG DAILY. DO NOT STOP THESE MEDICATIONS FOR ANY REASON AS THEY ARE KEEPING YOUR STENT OPEN AND PREVENTING A HEART ATTACK.  Please also continue your Lipitor (ATORVASTATIN) 80 mg daily as this helps decrease the amount plaque in the arteries of your heart. These medications were sent to your preferred pharmacy.   Please avoid strenuous activity or heavy lifting for the next five days. Do not take a bath or swim for the next five days; you may shower. For any bleeding or hematoma formation (hardened blood collection under the skin) at the access site of right groin and right wrist please hold pressure and go to the emergency room. Please follow up with Dr. Geronimo in 1-2 weeks. For recurrent chest pain, please call your doctor or go to the emergency room.        SECONDARY DISCHARGE DIAGNOSES  Diagnosis: Wenckebach second degree AV block  Assessment and Plan of Treatment: You were found to have this heart block on your cardiac monitor while in the hospital and seen by Electrophysiology. This is likely due to your sleep Apnea which needs to be treated. You will be mailed a two week cardiac monitor to your home. Follow-up with Dr. Geronimo in 1-2 weeks    Diagnosis: Pre-diabetes  Assessment and Plan of Treatment: Your hemoglobin a1c was 6.0. This represents your average blood sugar for the past 3 months. This puts you in the pre-diabetic range. We like to see this number below 5.3.  Please continue to follow a low carbohydrate, low sugar diet, maintain healthy exercise, and a healthy weight. Please follow-up with your PCP for further management.      Diagnosis: Hyperlipidemia  Assessment and Plan of Treatment: Please continue your ATORVASTATIN 80 MG (LIPITOR) as it helps keep your cholesterol low. High cholesterol contributes to heart disease.

## 2020-07-09 ENCOUNTER — TRANSCRIPTION ENCOUNTER (OUTPATIENT)
Age: 70
End: 2020-07-09

## 2020-07-09 VITALS — TEMPERATURE: 97 F

## 2020-07-09 LAB
ANION GAP SERPL CALC-SCNC: 13 MMOL/L — SIGNIFICANT CHANGE UP (ref 5–17)
BUN SERPL-MCNC: 11 MG/DL — SIGNIFICANT CHANGE UP (ref 7–23)
CALCIUM SERPL-MCNC: 8.5 MG/DL — SIGNIFICANT CHANGE UP (ref 8.4–10.5)
CHLORIDE SERPL-SCNC: 105 MMOL/L — SIGNIFICANT CHANGE UP (ref 96–108)
CO2 SERPL-SCNC: 22 MMOL/L — SIGNIFICANT CHANGE UP (ref 22–31)
CREAT SERPL-MCNC: 0.73 MG/DL — SIGNIFICANT CHANGE UP (ref 0.5–1.3)
GLUCOSE SERPL-MCNC: 129 MG/DL — HIGH (ref 70–99)
HCT VFR BLD CALC: 45.2 % — SIGNIFICANT CHANGE UP (ref 39–50)
HCV AB S/CO SERPL IA: 0.08 S/CO — SIGNIFICANT CHANGE UP
HCV AB SERPL-IMP: SIGNIFICANT CHANGE UP
HGB BLD-MCNC: 14.7 G/DL — SIGNIFICANT CHANGE UP (ref 13–17)
MAGNESIUM SERPL-MCNC: 2.1 MG/DL — SIGNIFICANT CHANGE UP (ref 1.6–2.6)
MCHC RBC-ENTMCNC: 27.6 PG — SIGNIFICANT CHANGE UP (ref 27–34)
MCHC RBC-ENTMCNC: 32.5 GM/DL — SIGNIFICANT CHANGE UP (ref 32–36)
MCV RBC AUTO: 85 FL — SIGNIFICANT CHANGE UP (ref 80–100)
NRBC # BLD: 0 /100 WBCS — SIGNIFICANT CHANGE UP (ref 0–0)
PLATELET # BLD AUTO: 138 K/UL — LOW (ref 150–400)
POTASSIUM SERPL-MCNC: 3.7 MMOL/L — SIGNIFICANT CHANGE UP (ref 3.5–5.3)
POTASSIUM SERPL-SCNC: 3.7 MMOL/L — SIGNIFICANT CHANGE UP (ref 3.5–5.3)
RBC # BLD: 5.32 M/UL — SIGNIFICANT CHANGE UP (ref 4.2–5.8)
RBC # FLD: 14 % — SIGNIFICANT CHANGE UP (ref 10.3–14.5)
SODIUM SERPL-SCNC: 140 MMOL/L — SIGNIFICANT CHANGE UP (ref 135–145)
WBC # BLD: 10.87 K/UL — HIGH (ref 3.8–10.5)
WBC # FLD AUTO: 10.87 K/UL — HIGH (ref 3.8–10.5)

## 2020-07-09 PROCEDURE — 99223 1ST HOSP IP/OBS HIGH 75: CPT

## 2020-07-09 PROCEDURE — 93010 ELECTROCARDIOGRAM REPORT: CPT

## 2020-07-09 PROCEDURE — 99239 HOSP IP/OBS DSCHRG MGMT >30: CPT

## 2020-07-09 RX ORDER — POTASSIUM CHLORIDE 20 MEQ
30 PACKET (EA) ORAL ONCE
Refills: 0 | Status: COMPLETED | OUTPATIENT
Start: 2020-07-09 | End: 2020-07-09

## 2020-07-09 RX ADMIN — CLOPIDOGREL BISULFATE 75 MILLIGRAM(S): 75 TABLET, FILM COATED ORAL at 09:44

## 2020-07-09 RX ADMIN — Medication 81 MILLIGRAM(S): at 09:44

## 2020-07-09 RX ADMIN — Medication 30 MILLIEQUIVALENT(S): at 07:48

## 2020-07-09 NOTE — CONSULT NOTE ADULT - SUBJECTIVE AND OBJECTIVE BOX
HPI:  71 yo obese M current smoker non-compliant with CAD s/p stents, HTN, HLD,  LBBB- who presented for a cath secondary to worsening KELLY s/p cath with 2 stents placed - overnight with asymptomatic Wenckebach - EP called for further recommendations.     He denies a history of syncope, dizziness or lightheadedness.  He states he recently had a sleep study and was told he did not have sleep apnea - but as per report scanned into system he was found to have severe sleep apnea - he does not use CPAP.  Overnight on tele he had asymptomatic Wenckebach and EP called for further recommendations.    Echo 6/23/20: EF 65%. Thickened mitral valve and aortic valve, Mild basal septal hypertrophy, mild LVH,  no segmental wall motion abnormalities, mild Diastolic Dysfunction.     PAST MEDICAL & SURGICAL HISTORY:  LBBB (left bundle branch block)  HLD (hyperlipidemia)  HTN (hypertension)  CAD (coronary artery disease)  History of hemorrhoidectomy      No pertinent family history in first degree relatives      Social History + smoker       Inpatient Medications:   aspirin enteric coated 81 milliGRAM(s) Oral daily  atorvastatin 80 milliGRAM(s) Oral at bedtime  clopidogrel Tablet 75 milliGRAM(s) Oral daily  nicotine - 21 mG/24Hr(s) Patch 1 patch Transdermal daily  sodium chloride 0.9%. 500 milliLiter(s) IV Continuous <Continuous>      Allergies: No Known Allergies      ROS:   CONSTITUTIONAL: No fever, weight loss    EYES: Pt denies  RESPIRATORY: No cough, wheezing, chills or hemoptysis   CARDIOVASCULAR: see HPI  GASTROINTESTINAL: Pt denies  NEUROLOGICAL: Pt denies  SKIN: Pt denies   PSYCHIATRIC: Pt denies  HEME/LYMPH: Pt denies    PHYSICAL:  T(C): 37.2 (07-09-20 @ 06:13), Max: 37.2 (07-09-20 @ 06:13)  HR: 72 (07-09-20 @ 07:25) (57 - 74)  BP: 128/86 (07-09-20 @ 07:25) (112/76 - 154/86)  RR: 18 (07-09-20 @ 07:25) (18 - 18)  SpO2: 93% (07-09-20 @ 07:25) (91% - 96%)     Appearance: No acute distress, well developed  Eyes: normal appearing conjunctiva, pupils and eyelids  Cardiovascular: Normal S1 S2, No JVD   Respiratory: Lungs clear to auscultation	   Gastrointestinal:  Soft, NT/ND 	  Neurologic:  No deficit noted  Psych: A&Ox3, normal mood/affect  Musculoskeletal: no deformities noted  Skin: no rash noted, normal color and pigmentation.        LABS:                        14.7   10.87 )-----------( 138      ( 09 Jul 2020 04:26 )             45.2     140  |  105  |  11  ----------------------------<  129<H>  3.7   |  22  |  0.73    Ca    8.5      09 Jul 2020 04:26  Mg     2.1     07-09    TPro  7.0  /  Alb  4.1  /  TBili  0.6  /  DBili  <0.2  /  AST  22  /  ALT  33  /  AlkPhos  124<H>  07-08    PT/INR - ( 08 Jul 2020 13:45 )   PT: 12.2 sec;   INR: 1.02          PTT - ( 08 Jul 2020 13:45 )  PTT:42.5 sec       EKG: none scanned in chart    Telemetry: NSR 60-90s with Wenckebach while he is sleeping         Assessment Plan:  71 yo obese M current smoker non-compliant with CAD s/p stents, HTN, HLD,  LBBB- who presented for a cath secondary to worsening KELLY s/p cath with 2 stents placed - overnight with asymptomatic Wenckebach - EP called for further recommendations. Patient with no syncope or near syncope.  As per report in chart with severe sleep apnea - which is likely cause of nighttime Wenckebach.  As per cardiology request will mail him a 2 week event monitor to assure he does not have more advanced heart rhythm issues given baseline conduction issues - we will arrange for this to be mailed to his house.  Ultimately he would benefit from follow up with the sleep study center and treatment of severe sleep apnea.  Please call [phone] with any questions or concerns.

## 2020-07-09 NOTE — PROGRESS NOTE ADULT - SUBJECTIVE AND OBJECTIVE BOX
INTERVENTIONAL CARDIOLOGY FOLLOW UP NOTE    -Patient seen and examined this am. Overnight telemetry showed episodes of Wenckebach, asymptomatic. No complaints this am. Denies chest pain, sob, palpitations, dizziness, lightheadedness. No pain over femoral access site.     T(C): 36.3 (07-09-20 @ 10:14), Max: 37.2 (07-09-20 @ 06:13)  HR: 77 (07-09-20 @ 10:00) (57 - 77)  BP: 136/83 (07-09-20 @ 10:00) (112/76 - 154/86)  RR: 18 (07-09-20 @ 10:00) (18 - 18)  SpO2: 95% (07-09-20 @ 10:00) (91% - 96%)    PHYSICAL EXAM:  General: well-developed, well-nourished, no acute distress  Cardiac: RRR + S1, S2  Pulm: CTAB    VASCULAR ACCESS EXAM:  FEMORAL:  2+ right/left femoral pulse  2+ DP/PT pulses.  -Access site clean, non-tender, without ecchymosis or hematoma.    A/P  69 yo M smoker hx of CAD, HTN, HLD, LBBB now s/p cath with GEETA to LAD/D1 via femoral approach with no evidence of vascular complications post procedure. Overnight had wenckebach on telemetry, asymptomatci.   - EP consult, if stable to go home then discharge after  - Continue DAPT  - Goal directed medical therapy  - Discharge instructions as per protocol  - Outpatient follow up with primary cardiologist, Dr. Geronimo

## 2020-07-09 NOTE — DISCHARGE NOTE NURSING/CASE MANAGEMENT/SOCIAL WORK - PATIENT PORTAL LINK FT
You can access the FollowMyHealth Patient Portal offered by [facility] by registering at the following website: [email or web address]. By joining [facility]’s FollowMyHealth portal, you will also be able to view your health information using other applications (apps) compatible with our system.

## 2020-07-10 PROCEDURE — 82550 ASSAY OF CK (CPK): CPT

## 2020-07-10 PROCEDURE — C1769: CPT

## 2020-07-10 PROCEDURE — C9600: CPT

## 2020-07-10 PROCEDURE — 36415 COLL VENOUS BLD VENIPUNCTURE: CPT

## 2020-07-10 PROCEDURE — 82553 CREATINE MB FRACTION: CPT

## 2020-07-10 PROCEDURE — 80061 LIPID PANEL: CPT

## 2020-07-10 PROCEDURE — 85610 PROTHROMBIN TIME: CPT

## 2020-07-10 PROCEDURE — C1874: CPT

## 2020-07-10 PROCEDURE — 80053 COMPREHEN METABOLIC PANEL: CPT

## 2020-07-10 PROCEDURE — 85730 THROMBOPLASTIN TIME PARTIAL: CPT

## 2020-07-10 PROCEDURE — 83735 ASSAY OF MAGNESIUM: CPT

## 2020-07-10 PROCEDURE — C9601: CPT

## 2020-07-10 PROCEDURE — 85027 COMPLETE CBC AUTOMATED: CPT

## 2020-07-10 PROCEDURE — C1894: CPT

## 2020-07-10 PROCEDURE — 87635 SARS-COV-2 COVID-19 AMP PRB: CPT

## 2020-07-10 PROCEDURE — C1887: CPT

## 2020-07-10 PROCEDURE — 93005 ELECTROCARDIOGRAM TRACING: CPT

## 2020-07-10 PROCEDURE — 86803 HEPATITIS C AB TEST: CPT

## 2020-07-10 PROCEDURE — C1725: CPT

## 2020-07-10 PROCEDURE — 93458 L HRT ARTERY/VENTRICLE ANGIO: CPT

## 2020-07-10 PROCEDURE — 83036 HEMOGLOBIN GLYCOSYLATED A1C: CPT

## 2020-07-10 PROCEDURE — 80048 BASIC METABOLIC PNL TOTAL CA: CPT

## 2020-07-10 PROCEDURE — 82248 BILIRUBIN DIRECT: CPT

## 2020-07-10 PROCEDURE — 85025 COMPLETE CBC W/AUTO DIFF WBC: CPT

## 2020-07-13 ENCOUNTER — NON-APPOINTMENT (OUTPATIENT)
Age: 70
End: 2020-07-13

## 2020-07-13 ENCOUNTER — APPOINTMENT (OUTPATIENT)
Dept: HEART AND VASCULAR | Facility: CLINIC | Age: 70
End: 2020-07-13
Payer: MEDICARE

## 2020-07-13 VITALS
TEMPERATURE: 98.2 F | SYSTOLIC BLOOD PRESSURE: 140 MMHG | HEIGHT: 65 IN | BODY MASS INDEX: 40.98 KG/M2 | DIASTOLIC BLOOD PRESSURE: 84 MMHG | WEIGHT: 246 LBS

## 2020-07-13 VITALS — SYSTOLIC BLOOD PRESSURE: 126 MMHG | DIASTOLIC BLOOD PRESSURE: 80 MMHG

## 2020-07-13 VITALS — HEART RATE: 72 BPM

## 2020-07-13 PROCEDURE — 93000 ELECTROCARDIOGRAM COMPLETE: CPT

## 2020-07-13 PROCEDURE — 99213 OFFICE O/P EST LOW 20 MIN: CPT | Mod: 25

## 2020-07-13 RX ORDER — CLOPIDOGREL BISULFATE 75 MG/1
75 TABLET, FILM COATED ORAL DAILY
Qty: 30 | Refills: 3 | Status: ACTIVE | COMMUNITY
Start: 1900-01-01 | End: 1900-01-01

## 2020-07-13 NOTE — HISTORY OF PRESENT ILLNESS
[FreeTextEntry1] : had PCI- states breathing betetr but still gest OOB when he bends over. had sleep study which revealed severe Ximena - pt states he doesn't feel it's accurate and he will s[peak to Dr Anderson. has minor groin pain at site

## 2020-07-13 NOTE — DISCUSSION/SUMMARY
[FreeTextEntry1] : EKG:NSR, IVCD,first degree AVB\par enforced need to take palvix and meds every day\par cont asa +Plavix for CAD,Liptor for lipids- f/u with Dr Anderson for SOLA

## 2020-07-13 NOTE — PHYSICAL EXAM
[General Appearance - Well Developed] : well developed [Normal Appearance] : normal appearance [Well Groomed] : well groomed [General Appearance - Well Nourished] : well nourished [No Deformities] : no deformities [General Appearance - In No Acute Distress] : no acute distress [Normal Conjunctiva] : the conjunctiva exhibited no abnormalities [Normal Jugular Venous V Waves Present] : normal jugular venous V waves present [Normal Jugular Venous A Waves Present] : normal jugular venous A waves present [No Jugular Venous Farias A Waves] : no jugular venous farias A waves [Heart Rate And Rhythm] : heart rate and rhythm were normal [Heart Sounds] : normal S1 and S2 [Murmurs] : no murmurs present [Edema] : no peripheral edema present [Abdomen Soft] : soft [Abdomen Tenderness] : non-tender [FreeTextEntry1] : mild ecchymosis right groin- no hematoma bruits [Abnormal Walk] : normal gait [Nail Clubbing] : no clubbing of the fingernails [Skin Color & Pigmentation] : normal skin color and pigmentation [Oriented To Time, Place, And Person] : oriented to person, place, and time

## 2020-07-17 DIAGNOSIS — R73.03 PREDIABETES: ICD-10-CM

## 2020-07-17 DIAGNOSIS — I10 ESSENTIAL (PRIMARY) HYPERTENSION: ICD-10-CM

## 2020-07-17 DIAGNOSIS — Z95.5 PRESENCE OF CORONARY ANGIOPLASTY IMPLANT AND GRAFT: ICD-10-CM

## 2020-07-17 DIAGNOSIS — I44.7 LEFT BUNDLE-BRANCH BLOCK, UNSPECIFIED: ICD-10-CM

## 2020-07-17 DIAGNOSIS — E78.5 HYPERLIPIDEMIA, UNSPECIFIED: ICD-10-CM

## 2020-07-17 DIAGNOSIS — I07.1 RHEUMATIC TRICUSPID INSUFFICIENCY: ICD-10-CM

## 2020-07-17 DIAGNOSIS — F17.210 NICOTINE DEPENDENCE, CIGARETTES, UNCOMPLICATED: ICD-10-CM

## 2020-07-17 DIAGNOSIS — I25.10 ATHEROSCLEROTIC HEART DISEASE OF NATIVE CORONARY ARTERY WITHOUT ANGINA PECTORIS: ICD-10-CM

## 2020-07-17 DIAGNOSIS — I25.110 ATHEROSCLEROTIC HEART DISEASE OF NATIVE CORONARY ARTERY WITH UNSTABLE ANGINA PECTORIS: ICD-10-CM

## 2020-07-17 DIAGNOSIS — Z91.19 PATIENT'S NONCOMPLIANCE WITH OTHER MEDICAL TREATMENT AND REGIMEN: ICD-10-CM

## 2020-07-17 DIAGNOSIS — I44.0 ATRIOVENTRICULAR BLOCK, FIRST DEGREE: ICD-10-CM

## 2020-09-22 ENCOUNTER — NON-APPOINTMENT (OUTPATIENT)
Age: 70
End: 2020-09-22

## 2020-09-22 ENCOUNTER — APPOINTMENT (OUTPATIENT)
Dept: HEART AND VASCULAR | Facility: CLINIC | Age: 70
End: 2020-09-22
Payer: MEDICARE

## 2020-09-22 VITALS
TEMPERATURE: 95.5 F | WEIGHT: 242 LBS | BODY MASS INDEX: 40.32 KG/M2 | HEART RATE: 95 BPM | HEIGHT: 65 IN | DIASTOLIC BLOOD PRESSURE: 84 MMHG | SYSTOLIC BLOOD PRESSURE: 132 MMHG

## 2020-09-22 DIAGNOSIS — E78.5 HYPERLIPIDEMIA, UNSPECIFIED: ICD-10-CM

## 2020-09-22 DIAGNOSIS — G47.33 OBSTRUCTIVE SLEEP APNEA (ADULT) (PEDIATRIC): ICD-10-CM

## 2020-09-22 DIAGNOSIS — R06.00 DYSPNEA, UNSPECIFIED: ICD-10-CM

## 2020-09-22 DIAGNOSIS — R03.0 ELEVATED BLOOD-PRESSURE READING, W/OUT DIAGNOSIS OF HYPERTENSION: ICD-10-CM

## 2020-09-22 DIAGNOSIS — I25.10 ATHEROSCLEROTIC HEART DISEASE OF NATIVE CORONARY ARTERY W/OUT ANGINA PECTORIS: ICD-10-CM

## 2020-09-22 DIAGNOSIS — I45.4 NONSPECIFIC INTRAVENTRICULAR BLOCK: ICD-10-CM

## 2020-09-22 PROBLEM — I10 ESSENTIAL (PRIMARY) HYPERTENSION: Chronic | Status: ACTIVE | Noted: 2020-07-06

## 2020-09-22 PROBLEM — I44.7 LEFT BUNDLE-BRANCH BLOCK, UNSPECIFIED: Chronic | Status: ACTIVE | Noted: 2020-07-06

## 2020-09-22 PROCEDURE — 93000 ELECTROCARDIOGRAM COMPLETE: CPT

## 2020-09-22 PROCEDURE — 99214 OFFICE O/P EST MOD 30 MIN: CPT | Mod: 25

## 2020-09-22 RX ORDER — LISINOPRIL 10 MG/1
10 TABLET ORAL DAILY
Qty: 30 | Refills: 1 | Status: ACTIVE | COMMUNITY
Start: 2020-09-22 | End: 1900-01-01

## 2020-09-22 NOTE — HISTORY OF PRESENT ILLNESS
[FreeTextEntry1] : states no sob but breathing abit noticable going up hills- was to have f/u with dr Anderson for SOLA\par saw PCP and his BP was high and prescribed something

## 2020-09-22 NOTE — REVIEW OF SYSTEMS
[Recent Weight Gain (___ Lbs)] : recent [unfilled] ~Ulb weight gain [Feeling Fatigued] : feeling fatigued [Eyeglasses] : currently wearing eyeglasses [Dyspnea on exertion] : dyspnea during exertion [Nocturia] : nocturia [see HPI] : see HPI [Under Stress] : under stress [Negative] : Heme/Lymph [Fever] : no fever [Headache] : no headache [Chills] : no chills [Recent Weight Loss (___ Lbs)] : no recent weight loss [Blurry Vision] : no blurred vision [Seeing Double (Diplopia)] : no diplopia [Eye Pain] : no eye pain [Loss Of Hearing] : no hearing loss [Sore Throat] : no sore throat [Cough] : no cough [Wheezing] : no wheezing [Coughing Up Blood] : no hemoptysis [Hematuria] : no hematuria [Pain During Urination] : no dysuria [Joint Pain] : no joint pain [Joint Swelling] : no joint swelling [Joint Stiffness] : no joint stiffness [Muscle Cramps] : no muscle cramps [Limb Weakness (Paresis)] : no limb weakness [Confusion] : no confusion was observed [Memory Lapses Or Loss] : no memory lapses or loss [Depression] : no depression [Anxiety] : no anxiety [Suicidal] : not suicidal

## 2020-09-22 NOTE — DISCUSSION/SUMMARY
[FreeTextEntry1] : EKG:NSR,IVCD(no change)\par called pharmacy and was prescribed a med for hptn(zestril 20mg)- will prescribe 10mg for bp\par cont asa +Plavix for CAD;lipitor for lipids\par i feel dyspnea is weight related and pulmonary in nature- needs to lose weight and f/u with dr Anderson

## 2020-09-22 NOTE — PHYSICAL EXAM
[General Appearance - Well Developed] : well developed [Normal Appearance] : normal appearance [Well Groomed] : well groomed [General Appearance - Well Nourished] : well nourished [No Deformities] : no deformities [General Appearance - In No Acute Distress] : no acute distress [Normal Conjunctiva] : the conjunctiva exhibited no abnormalities [Normal Oral Mucosa] : normal oral mucosa [Normal Jugular Venous A Waves Present] : normal jugular venous A waves present [Normal Jugular Venous V Waves Present] : normal jugular venous V waves present [No Jugular Venous Farias A Waves] : no jugular venous farias A waves [] : no respiratory distress [Respiration, Rhythm And Depth] : normal respiratory rhythm and effort [Exaggerated Use Of Accessory Muscles For Inspiration] : no accessory muscle use [Auscultation Breath Sounds / Voice Sounds] : lungs were clear to auscultation bilaterally [Heart Rate And Rhythm] : heart rate and rhythm were normal [Heart Sounds] : normal S1 and S2 [Murmurs] : no murmurs present [Edema] : no peripheral edema present [Bowel Sounds] : normal bowel sounds [Abdomen Soft] : soft [Abdomen Tenderness] : non-tender [Abnormal Walk] : normal gait [Nail Clubbing] : no clubbing of the fingernails [Skin Color & Pigmentation] : normal skin color and pigmentation [Oriented To Time, Place, And Person] : oriented to person, place, and time [FreeTextEntry1] : carotids w/o bruits

## 2020-12-30 ENCOUNTER — NON-APPOINTMENT (OUTPATIENT)
Age: 70
End: 2020-12-30

## 2021-01-28 ENCOUNTER — APPOINTMENT (OUTPATIENT)
Dept: HEART AND VASCULAR | Facility: CLINIC | Age: 71
End: 2021-01-28

## 2021-12-01 ENCOUNTER — NON-APPOINTMENT (OUTPATIENT)
Age: 71
End: 2021-12-01

## 2022-01-11 NOTE — HISTORY OF PRESENT ILLNESS
Referred by: Lincoln Vanessa MD; Medical Diagnosis (from order):    Diagnosis Information      Diagnosis    719.43 (ICD-9-CM) - M25.531 (ICD-10-CM) - Right wrist pain                Daily Treatment Note    Visit:  13     SUBJECTIVE                                                                                                               Patient asks, \"do you think it's okay I start doing the dishes at home?\"    OBJECTIVE                                                                                                                        TREATMENT                                                                                                                  Therapeutic Exercise:  Weeks +8 (1/6/2022+) Strengthening phase     Education: reinforced weaning from orthosis    AAROM - wrist wheel stretch   AROM - wrist extension off tabletop surface with forearm pronated 2 sets X 10     Wrist, forearm, and elbow isometrics sub-maximal against therapist resistance     Therabar - light tan  -bend down X 10  -bend up X 10   -rolling volar and dorsal X 10   -sustained  with resistance 4 sets X 15 seconds     Simulated hammering 1 plate 2 sets X 10   Donning/doffing nut and bolt X 1     strength - metal  tool level 1 2 sets X 30 seconds with forearm neutral     Isotonics 1 set X 10   Forearm rotation 2#  Elbow flexion/extension forearm neutral 2#   Wrist flexion/extension with forearm neutral 2#   Wrist flexion forearm supinated 2#   Wrist extension forearm pronated 1#  Wrist radial/ulnar deviation 1#   -pincer pinch 2# , 4#   -3-point pinch 2# , 4#   -lateral pinch  4#           Skilled input: verbal instruction/cues    Writer verbally educated and received verbal consent for hand placement, positioning of patient, and techniques to be performed today from patient     Home Exercise Program/Education Materials: Access Code: [email or web address]  URL: [email or web address]  Date:  11/22/2021  Prepared by: Nancy Bai     Exercises  Seated Forearm Pronation and Supination AROM - 3 x daily - 7 x weekly - 10 reps  Seated Elbow Flexion and Extension AROM - 3 x daily - 7 x weekly - 10 reps  Seated Backward Shoulder Rolls - 3 x daily - 7 x weekly - 10 reps  Thumb Opposition - 3 x daily - 7 x weekly - 10 reps  Wrist Tendon Gliding - 3 x daily - 7 x weekly - 10 reps    Updated HeP 12/9/2021 to include   Wrist AROM flexion/extension and radial/ulnar deviation 4-5X per day PAIN-FREE ROM    Updated HEP 1/6/2021 to include   Wrist flexion/extension isometrics  Gripping Medium resistive  Pink Sponge      ASSESSMENT                                                                                                             Patient demonstrates good progress within strengthening phase. Occasionally discomfort increases with exercises, however, subsides quickly when ending exercise. Pain generally present across dorsum of wrist / ulnar side of hand when present. Reinforced weaning from orthosis and gradual, pain-free increase in activities outside of therapy.   Pain/symptoms after session (out of 10): 2  Patient Education:   Results of above outlined education: Verbalizes understanding, Demonstrates understanding and Needs reinforcement         Therapy procedure time and total treatment time can be found documented on the Time Entry flowsheet   [Awakes Unrefreshed] : awakes unrefreshed [Awakes with Dry Mouth] : awakes with dry mouth [Daytime Somnolence] : daytime somnolence [Frequent Nocturnal Awakening] : frequent nocturnal awakening [Recent  Weight Gain] : recent  weight gain [Unusual Movements] : unusual movements [Current] : current [>= 30 pack years] : >= 30 pack years [Never] : never [TextBox_4] : 70 yr old male with PMH of cardiac stent, HLD, current smoker for 50 yrs 1 pack a day.  He presents today for evaluation SOB.  \par \par He states he has had complaints of chronic SOB.  He is SOB with walking 1 block and going up hill.  He has gained 27 lbs sine 2017.  He has not followed with cardiology since 2017.  His son says he snores at night and he wakes up frequently at night.\par \par He denies fever, wheezing, chest pain, edema.  He has occasional coughing.  [TextBox_11] : 1 [TextBox_13] : 50

## 2022-02-01 NOTE — DISCHARGE NOTE NURSING/CASE MANAGEMENT/SOCIAL WORK - NSTRANSFERBELONGINGSDISPO_GEN_A_NUR
with patient Valtrex Counseling: I discussed with the patient the risks of valacyclovir including but not limited to kidney damage, nausea, vomiting and severe allergy.  The patient understands that if the infection seems to be worsening or is not improving, they are to call.

## 2022-02-02 ENCOUNTER — TRANSCRIPTION ENCOUNTER (OUTPATIENT)
Age: 72
End: 2022-02-02

## 2022-04-11 PROBLEM — Z11.59 SCREENING FOR VIRAL DISEASE: Status: ACTIVE | Noted: 2020-06-17

## 2022-11-03 ENCOUNTER — TRANSCRIPTION ENCOUNTER (OUTPATIENT)
Age: 72
End: 2022-11-03

## 2025-02-03 NOTE — PHYSICAL EXAM
[Normal Appearance] : normal appearance [General Appearance - Well Developed] : well developed [Well Groomed] : well groomed [General Appearance - Well Nourished] : well nourished [No Deformities] : no deformities [Normal Conjunctiva] : the conjunctiva exhibited no abnormalities [General Appearance - In No Acute Distress] : no acute distress [Normal Jugular Venous A Waves Present] : normal jugular venous A waves present [Normal Oral Mucosa] : normal oral mucosa [No Jugular Venous Farias A Waves] : no jugular venous farias A waves [Normal Jugular Venous V Waves Present] : normal jugular venous V waves present [Heart Rate And Rhythm] : heart rate and rhythm were normal [Murmurs] : no murmurs present [Heart Sounds] : normal S1 and S2 [] : no respiratory distress [Edema] : no peripheral edema present [Respiration, Rhythm And Depth] : normal respiratory rhythm and effort [Exaggerated Use Of Accessory Muscles For Inspiration] : no accessory muscle use [Auscultation Breath Sounds / Voice Sounds] : lungs were clear to auscultation bilaterally [Bowel Sounds] : normal bowel sounds [Abdomen Soft] : soft [Abnormal Walk] : normal gait [Abdomen Tenderness] : non-tender [Nail Clubbing] : no clubbing of the fingernails [Skin Color & Pigmentation] : normal skin color and pigmentation [Oriented To Time, Place, And Person] : oriented to person, place, and time [FreeTextEntry1] : carotids w/o bruits no